# Patient Record
Sex: FEMALE | Race: WHITE | NOT HISPANIC OR LATINO | Employment: STUDENT | URBAN - METROPOLITAN AREA
[De-identification: names, ages, dates, MRNs, and addresses within clinical notes are randomized per-mention and may not be internally consistent; named-entity substitution may affect disease eponyms.]

---
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
## 2020-01-08 ENCOUNTER — APPOINTMENT (OUTPATIENT)
Dept: RADIOLOGY | Facility: CLINIC | Age: 20
End: 2020-01-08
Attending: FAMILY MEDICINE
Payer: COMMERCIAL

## 2020-01-08 ENCOUNTER — OFFICE VISIT (OUTPATIENT)
Dept: URGENT CARE | Facility: CLINIC | Age: 20
End: 2020-01-08
Payer: COMMERCIAL

## 2020-01-08 VITALS
OXYGEN SATURATION: 100 % | WEIGHT: 95.6 LBS | HEIGHT: 63 IN | HEART RATE: 63 BPM | TEMPERATURE: 98.7 F | DIASTOLIC BLOOD PRESSURE: 60 MMHG | BODY MASS INDEX: 16.94 KG/M2 | RESPIRATION RATE: 16 BRPM | SYSTOLIC BLOOD PRESSURE: 92 MMHG

## 2020-01-08 DIAGNOSIS — M25.562 ACUTE PAIN OF LEFT KNEE: ICD-10-CM

## 2020-01-08 DIAGNOSIS — M22.2X2 PATELLOFEMORAL SYNDROME OF LEFT KNEE: Primary | ICD-10-CM

## 2020-01-08 PROBLEM — S83.92XA SPRAIN OF LEFT KNEE: Status: ACTIVE | Noted: 2020-01-08

## 2020-01-08 PROCEDURE — 73564 X-RAY EXAM KNEE 4 OR MORE: CPT

## 2020-01-08 PROCEDURE — 99213 OFFICE O/P EST LOW 20 MIN: CPT | Performed by: FAMILY MEDICINE

## 2020-01-08 RX ORDER — ESTAZOLAM 2 MG/1
1 TABLET ORAL DAILY
COMMUNITY
Start: 2019-12-02

## 2020-01-09 NOTE — PATIENT INSTRUCTIONS
1  Patellofemoral syndrome of the left knee  - xrays show no acute abnormalities  - advised to rest the leg and apply ice to the knee    - take Tylenol or Motrin as needed for pain   - ace wrap provided to be used as needed, advised wearing a knee brace or ace wrap for comfort and support  - recommended to decrease sports and gym participation for now   - referral provided to Dr Aminata Edwards in Orthopedics for further evaluation and treatment

## 2020-01-09 NOTE — PROGRESS NOTES
[facility]        NAME: Audrey Theodore is a 20 y o  female  : 2000    MRN: [id number]  DATE: 2020   TIME: 1:12 PM    Assessment and Plan   Patellofemoral syndrome of left knee [M22 2X2]  1  Patellofemoral syndrome of left knee  XR knee 4+ vw left injury    Ambulatory referral to Orthopedic Surgery         Patient Instructions     Patient Instructions   1  Patellofemoral syndrome of the left knee  - xrays show no acute abnormalities  - advised to rest the leg and apply ice to the knee    - take Tylenol or Motrin as needed for pain   - ace wrap provided to be used as needed, advised wearing a knee brace or ace wrap for comfort and support  - recommended to decrease sports and gym participation for now   - referral provided to Dr Neil Solano in Orthopedics for further evaluation and treatment       Follow up with PCP in 3-5 days  Proceed to  ER if symptoms worsen  Chief Complaint     Chief Complaint   Patient presents with    Knee Pain         History of Present Illness       19 yo female presents c/o left knee pain which has been on and off for 7 months  Pain is located in the anterior knee on both sides of the patella  She denies any injuries to the knee, but states she works out at [employer] with multiple different exercises daily  She denies any pain w/ rest but experiences pain w/ certain movements and going up and down the stairs  She denies any pain radiating down the leg  No swelling or bruising of the knee  No numbness/tingling or weakness of the leg  She has full ROM of the knee joint  She is able to walk and bear weight without any limping but does feel the knee pain at times  No R knee symptoms  No hip or ankle pain  She denies any hx of previous injuries to the knee  She has not attempted any treatment for the symptoms  She has not been seen for this prior to today  Review of Systems   Review of Systems   Constitutional: Negative      Musculoskeletal:        As noted in HPI Skin: Negative  Neurological: Negative  Hematological: Negative  Current Medications       Current Outpatient Medications:     MICROGESTIN 1-20 MG-MCG per tablet, Take 1 tablet by mouth daily, Disp: , Rfl:     Current Allergies     Allergies as of 01/08/2020    (No Known Allergies)            The following portions of the patient's history were reviewed and updated as appropriate: allergies, current medications, past family history, past medical history, past social history, past surgical history and problem list      Past Medical History:   Diagnosis Date    Patient denies medical problems        Past Surgical History:   Procedure Laterality Date    TONSILLECTOMY         Family History   Problem Relation Age of Onset    Thyroid disease Mother     No Known Problems Father          Medications have been verified  Objective   BP 92/60 (BP Location: Right arm, Patient Position: Sitting, Cuff Size: Standard)   Pulse 63   Temp 98 7 °F (37 1 °C) (Tympanic)   Resp 16   Ht 5' 3" (1 6 m)   Wt 43 4 kg (95 lb 9 6 oz)   SpO2 100%   BMI 16 93 kg/m²        Physical Exam     Physical Exam   Constitutional: She is oriented to person, place, and time  Vital signs are normal  She appears well-developed and well-nourished  She is active and cooperative  Non-toxic appearance  She does not have a sickly appearance  She does not appear ill  No distress  Musculoskeletal:   Left lower extremity: no swelling, erythema, or bruising  No tenderness to palpation  Knee w/ full ROM  Patient c/o anterior knee/patellar pain w/ movement of the knee  Strength and sensations intact  Normal gait  Neurological: She is alert and oriented to person, place, and time  Skin: Skin is warm, dry and intact  No abrasion, no bruising, no burn, no ecchymosis and no lesion noted  She is not diaphoretic  No erythema  No pallor  Psychiatric: She has a normal mood and affect   Her behavior is normal  Judgment and thought content normal    Nursing note and vitals reviewed

## 2020-01-13 ENCOUNTER — CONSULT (OUTPATIENT)
Dept: OBGYN CLINIC | Facility: CLINIC | Age: 20
End: 2020-01-13
Attending: FAMILY MEDICINE
Payer: COMMERCIAL

## 2020-01-13 VITALS
BODY MASS INDEX: 17.72 KG/M2 | DIASTOLIC BLOOD PRESSURE: 63 MMHG | WEIGHT: 100 LBS | SYSTOLIC BLOOD PRESSURE: 93 MMHG | HEART RATE: 57 BPM | HEIGHT: 63 IN

## 2020-01-13 DIAGNOSIS — M22.2X2 PATELLOFEMORAL SYNDROME OF LEFT KNEE: ICD-10-CM

## 2020-01-13 PROCEDURE — 99203 OFFICE O/P NEW LOW 30 MIN: CPT | Performed by: ORTHOPAEDIC SURGERY

## 2025-03-20 NOTE — PROGRESS NOTES
Assessment/Plan:  1  Patellofemoral syndrome of left knee  Ambulatory referral to Orthopedic Surgery    Ambulatory referral to Physical Therapy       Rebecca Meraz has left-sided knee pain consistent with patellofemoral syndrome  We discussed multiple treatment modalities which can include ice, anti-inflammatories, rest and activity modification  She should reduce the squatting and lunges and she is doing in the gym  I gave her home exercise regimen for strengthening of the VMO and hip as well as a patellar stabilizing knee brace in the office today  We could consider formal physical therapy if the pain worsens and I did give her script for PT  She does go to college under past for again she could undergo physical therapy at a PT office out tear  She will follow up with me in the summer when she returns from college if the pain persists  Subjective:   Vance Brice is a 20 y o  female who presents to the office for evaluation for left-sided knee pain  She denies any injury or trauma but states she has been having intermittent discomfort of left-sided knee pain for the past several months  She first noticed the pain around 7 months ago  She did not have a fall or twisting injury but she states she has been going to the gym on a regular basis  She goes to the gym about 6 days a week and does do lower body exercises such as squatting and lunges which seem to make the pain worse  She denies any swelling or bruising  She denies any radiating pain or numbness  Today the pain is an aching mild discomfort around her patella  Does seem to worsen with increased walking as well  She has tried over-the-counter Ace bandage which helps slightly  She did go to urgent care last week and had x-rays showing no fractures and was referred to see me for suspicion of patellofemoral syndrome  Review of Systems   Constitutional: Negative for chills, fever and unexpected weight change     HENT: Negative for hearing loss, normal gait and station , no tenderness or deformities present nosebleeds and sore throat  Eyes: Negative for pain, redness and visual disturbance  Respiratory: Negative for cough, shortness of breath and wheezing  Cardiovascular: Negative for chest pain, palpitations and leg swelling  Gastrointestinal: Negative for abdominal pain, nausea and vomiting  Endocrine: Negative for polydipsia and polyuria  Genitourinary: Negative for dysuria and hematuria  Musculoskeletal:        See HPI   Skin: Negative for rash and wound  Neurological: Negative for dizziness, numbness and headaches  Psychiatric/Behavioral: Negative for decreased concentration and suicidal ideas  The patient is not nervous/anxious  Past Medical History:   Diagnosis Date    Patient denies medical problems        Past Surgical History:   Procedure Laterality Date    TONSILLECTOMY         Family History   Problem Relation Age of Onset    Thyroid disease Mother     No Known Problems Father        Social History     Occupational History    Not on file   Tobacco Use    Smoking status: Never Smoker    Smokeless tobacco: Never Used   Substance and Sexual Activity    Alcohol use: Yes     Frequency: Monthly or less     Drinks per session: 1 or 2    Drug use: No    Sexual activity: Not on file         Current Outpatient Medications:     MICROGESTIN 1-20 MG-MCG per tablet, Take 1 tablet by mouth daily, Disp: , Rfl:     No Known Allergies    Objective:  Vitals:    01/13/20 0922   BP: 93/63   Pulse: 57       Left Knee Exam     Tenderness   The patient is experiencing tenderness in the patella and medial retinaculum  Range of Motion   Extension: normal   Flexion: normal     Tests   Memo:  Medial - negative Lateral - negative  Lachman:  Anterior - negative        Other   Erythema: absent  Sensation: normal  Pulse: present  Swelling: none  Effusion: no effusion present    Comments:  + patellar grind test          Observations   Left Knee   Negative for effusion         Physical Exam Constitutional: She is oriented to person, place, and time  She appears well-developed and well-nourished  HENT:   Head: Normocephalic and atraumatic  Eyes: Pupils are equal, round, and reactive to light  Conjunctivae are normal    Neck: Normal range of motion  Neck supple  Cardiovascular: Normal rate and intact distal pulses  Pulmonary/Chest: Effort normal  No respiratory distress  Musculoskeletal:        Left knee: She exhibits no effusion  As noted in HPI   Neurological: She is alert and oriented to person, place, and time  Skin: Skin is warm and dry  Psychiatric: She has a normal mood and affect  Her behavior is normal    Nursing note and vitals reviewed  I have personally reviewed pertinent films in PACS and my interpretation is as follows: Four view x-rays of the left knee dated 1/8/2020 demonstrates no evidence of acute fracture